# Patient Record
Sex: FEMALE | Race: BLACK OR AFRICAN AMERICAN | URBAN - METROPOLITAN AREA
[De-identification: names, ages, dates, MRNs, and addresses within clinical notes are randomized per-mention and may not be internally consistent; named-entity substitution may affect disease eponyms.]

---

## 2024-01-18 ENCOUNTER — OFFICE VISIT (OUTPATIENT)
Age: 34
End: 2024-01-18

## 2024-01-18 VITALS
HEART RATE: 83 BPM | OXYGEN SATURATION: 98 % | WEIGHT: 170 LBS | SYSTOLIC BLOOD PRESSURE: 147 MMHG | DIASTOLIC BLOOD PRESSURE: 88 MMHG | BODY MASS INDEX: 30.11 KG/M2 | TEMPERATURE: 98.7 F

## 2024-01-18 DIAGNOSIS — R05.9 COUGH, UNSPECIFIED TYPE: Primary | ICD-10-CM

## 2024-01-18 DIAGNOSIS — U07.1 COVID: ICD-10-CM

## 2024-01-18 LAB
Lab: ABNORMAL
QC PASS/FAIL: ABNORMAL
SARS-COV-2 RDRP RESP QL NAA+PROBE: POSITIVE

## 2024-01-18 ASSESSMENT — ENCOUNTER SYMPTOMS: COUGH: 1

## 2024-01-18 NOTE — PROGRESS NOTES
Evette Jacob (:  1990) is a 33 y.o. female,New patient, here for evaluation of the following chief complaint(s):  Cough (Cough, sneezing, fever, headaches, diarrhea, body aches, SOB, wheezing x 4 days)      ASSESSMENT/PLAN:  1. Cough, unspecified type  - POCT COVID-19 Rapid, NAAT POSITIVE    2. COVID  - nirmatrelvir/ritonavir 300/100 (PAXLOVID, 300/100,) 20 x 150 MG & 10 x 100MG TBPK; Take 3 tablets (two 150 mg nirmatrelvir and one 100 mg ritonavir tablets) by mouth every 12 hours for 5 days.  Dispense: 30 tablet; Refill: 0   -COVID TEST POSITIVE  -SELF QUARANTINE FOR 5 DAYS    Return if symptoms worsen or fail to improve.    SUBJECTIVE/OBJECTIVE:  PRESENT TO CLINIC WITH COUGH, SNEEZING ,DIARRHEA, BODY ACHES,HEADACHE, FEVER,SOB AND WHEEZING FOR 4 DAYS.COVID TEST WAS NEGATIVE FEW DAYS AGO.      History provided by:  Patient  Cough    COUGH, SNEEZING, DIARRHEA,FEVER, BODYACHES,    Vitals:    24 1510   BP: (!) 147/88   Site: Right Upper Arm   Position: Sitting   Cuff Size: Medium Adult   Pulse: 83   Temp: 98.7 °F (37.1 °C)   TempSrc: Oral   SpO2: 98%   Weight: 77.1 kg (170 lb)       Review of Systems   Respiratory:  Positive for cough.        Physical Exam  Constitutional:       Appearance: Normal appearance.   HENT:      Head: Normocephalic and atraumatic.      Nose: Nose normal.      Mouth/Throat:      Mouth: Mucous membranes are moist.   Eyes:      Pupils: Pupils are equal, round, and reactive to light.   Pulmonary:      Effort: Pulmonary effort is normal.      Breath sounds: Normal breath sounds.   Musculoskeletal:         General: Normal range of motion.      Cervical back: Normal range of motion and neck supple.   Neurological:      Mental Status: She is alert and oriented to person, place, and time.   Psychiatric:         Mood and Affect: Mood normal.           An electronic signature was used to authenticate this note.    --Doris Mcnally DO

## 2024-05-31 ENCOUNTER — TRANSCRIBE ORDERS (OUTPATIENT)
Dept: ADMINISTRATIVE | Age: 34
End: 2024-05-31

## 2024-05-31 DIAGNOSIS — R10.32 ABDOMINAL PAIN, LEFT LOWER QUADRANT: ICD-10-CM

## 2024-05-31 DIAGNOSIS — R10.0 ACUTE ABDOMINAL PAIN SYNDROME: Primary | ICD-10-CM

## 2024-08-05 ENCOUNTER — OFFICE VISIT (OUTPATIENT)
Age: 34
End: 2024-08-05

## 2024-08-05 VITALS
BODY MASS INDEX: 30.5 KG/M2 | OXYGEN SATURATION: 98 % | SYSTOLIC BLOOD PRESSURE: 129 MMHG | TEMPERATURE: 98.6 F | DIASTOLIC BLOOD PRESSURE: 83 MMHG | WEIGHT: 172.2 LBS | HEART RATE: 84 BPM

## 2024-08-05 DIAGNOSIS — R52 BODY ACHES: ICD-10-CM

## 2024-08-05 DIAGNOSIS — B34.9 VIRAL SYNDROME: Primary | ICD-10-CM

## 2024-08-05 DIAGNOSIS — J02.9 ACUTE SORE THROAT: ICD-10-CM

## 2024-08-05 LAB
Lab: NORMAL
PERFORMING INSTRUMENT: NORMAL
QC PASS/FAIL: NORMAL
SARS-COV-2, POC: NORMAL
STREPTOCOCCUS A RNA: NEGATIVE

## 2024-08-05 RX ORDER — GUAIFENESIN AND DEXTROMETHORPHAN HYDROBROMIDE 600; 30 MG/1; MG/1
1 TABLET, EXTENDED RELEASE ORAL 2 TIMES DAILY PRN
Qty: 28 TABLET | Refills: 0 | Status: SHIPPED | OUTPATIENT
Start: 2024-08-05

## 2024-08-05 ASSESSMENT — ENCOUNTER SYMPTOMS
WHEEZING: 0
VOMITING: 0
EYE DISCHARGE: 0
TROUBLE SWALLOWING: 1
COUGH: 0
RHINORRHEA: 0
NAUSEA: 0
SINUS PAIN: 0
ABDOMINAL PAIN: 0
SORE THROAT: 1
DIARRHEA: 0
CHEST TIGHTNESS: 0
SHORTNESS OF BREATH: 0
SINUS PRESSURE: 0

## 2024-08-05 NOTE — PROGRESS NOTES
[facility], [facility] ([place])  [facility]  [address] [facility] [address] [address]  Dept: [phone]  Dept Fax: [phone]  Loc: [phone]  Evette Jacob is a 34 y.o. female who presents today for her medical conditions/complaintsas noted below.  Evette Jacob is c/o of Generalized Body Aches (Patient presents with body aches, had a fever yesterday and today but has been taking ibuprofen, tested positive for COVID this morning at home.)      HPI:       Generalized Body Aches  Severity:  Moderate  Onset quality:  Sudden  Duration:  12 hours  Progression:  Worsening  Chronicity:  New  Relieved by:  Rest  Worsened by:  Activity  Associated symptoms: fatigue, fever, myalgias and sore throat    Associated symptoms: no abdominal pain, no chest pain, no congestion, no cough, no diarrhea, no headaches, no loss of consciousness, no nausea, no rash, no rhinorrhea, no shortness of breath, no vomiting and no wheezing        History reviewed. No pertinent past medical history.   Past Surgical History:   Procedure Laterality Date    HYSTERECTOMY (CERVIX STATUS UNKNOWN)      partial    LEEP         History reviewed. No pertinent family history.    Social History     Tobacco Use    Smoking status: Every Day     Current packs/day: 0.33     Types: Cigarettes    Smokeless tobacco: Never   Substance Use Topics    Alcohol use: Yes     Comment: occ      Current Outpatient Medications   Medication Sig Dispense Refill    ondansetron (ZOFRAN ODT) 4 MG disintegrating tablet Take 1-2 tablets by mouth every 12 hours as needed for Nausea May Sub regular tablet (non-ODT) if insurance does not cover ODT. (Patient not taking: Reported on 1/18/2024) 12 tablet 0    naproxen (NAPROSYN) 500 MG tablet Take 1 tablet by mouth 2 times daily for 20 doses. 20 tablet 0    ibuprofen (IBU) 800 MG tablet Take 1 tablet by mouth every 8 hours as needed for Pain. (Patient not taking: Reported on 8/5/2024) 20 tablet 0     No

## 2025-07-31 ENCOUNTER — OFFICE VISIT (OUTPATIENT)
Age: 35
End: 2025-07-31

## 2025-07-31 VITALS
WEIGHT: 170.6 LBS | SYSTOLIC BLOOD PRESSURE: 113 MMHG | BODY MASS INDEX: 30.22 KG/M2 | OXYGEN SATURATION: 96 % | HEART RATE: 89 BPM | TEMPERATURE: 98.5 F | DIASTOLIC BLOOD PRESSURE: 73 MMHG

## 2025-07-31 DIAGNOSIS — R10.9 ABDOMINAL PAIN, UNSPECIFIED ABDOMINAL LOCATION: Primary | ICD-10-CM

## 2025-07-31 DIAGNOSIS — R14.0 ABDOMINAL DISTENTION: ICD-10-CM

## 2025-07-31 ASSESSMENT — ENCOUNTER SYMPTOMS: ABDOMINAL PAIN: 1

## 2025-07-31 NOTE — PROGRESS NOTES
Evette Jacob (:  1990) is a 35 y.o. female,Established patient, here for evaluation of the following chief complaint(s):  Abdominal Pain (Patient complains of \"extreme\" stomach cramping, feels very bloated x 3 days.  States she is having normal bowel movements.)      Assessment & Plan :  Visit Diagnoses and Associated Orders         Abdominal pain, unspecified abdominal location    -  Primary           Abdominal distention                     Patient was seen and evaluated for above symptoms.  On evaluation abdominal distended, active, and significant tenderness on palpitation.  Discussed with patient unfortunately at this point I felt it is necessary for her to be further evaluated in the emergency department to be here to the acute abdomen.  Explained to her that additional imaging is needed just due to the amount of distention.  Patient verbalized her understanding and agreed to go to [facility].  Report called over to [facility] provided to provider Shukri.  Instructed patient to go straight to the emergency department for further evaluation and treatment.  Patient verbalized and agreed to plan of care.       Subjective :    Abdominal Pain         35 y.o. female presents with symptoms of abdominal pain. Reports onset of symptoms 3 days ago.  Reports, pain feels like severe abdominal cramping.  Reports she feels bloated.  Reports she is having regular bowel movements.  Reports her stool was not hard and soft snake like.  She reports her normal bowel movements are 1-2 a day.  Reports that she did not think much of the stomach cramping until today when she could barely stand up without having severe cramping.  Denies any known fevers but has felt hot and chilled.  History of hysterectomy.         Vitals:    25 1918   BP: 113/73   BP Site: Right Upper Arm   Patient Position: Sitting   BP Cuff Size: Medium Adult   Pulse: 89   Temp: 98.5 °F (36.9 °C)   TempSrc: Oral   SpO2: 96%